# Patient Record
Sex: FEMALE | Race: WHITE | NOT HISPANIC OR LATINO | Employment: FULL TIME | URBAN - METROPOLITAN AREA
[De-identification: names, ages, dates, MRNs, and addresses within clinical notes are randomized per-mention and may not be internally consistent; named-entity substitution may affect disease eponyms.]

---

## 2022-10-05 ENCOUNTER — LAB REQUISITION (OUTPATIENT)
Dept: LAB | Facility: CLINIC | Age: 22
End: 2022-10-05

## 2022-10-05 ENCOUNTER — LAB REQUISITION (OUTPATIENT)
Dept: LAB | Facility: CLINIC | Age: 22
End: 2022-10-05
Payer: COMMERCIAL

## 2022-10-05 DIAGNOSIS — O26.851 SPOTTING COMPLICATING PREGNANCY, FIRST TRIMESTER: ICD-10-CM

## 2022-10-05 DIAGNOSIS — Z32.01 ENCOUNTER FOR PREGNANCY TEST, RESULT POSITIVE: ICD-10-CM

## 2022-10-05 PROCEDURE — 86850 RBC ANTIBODY SCREEN: CPT | Mod: ORL | Performed by: STUDENT IN AN ORGANIZED HEALTH CARE EDUCATION/TRAINING PROGRAM

## 2022-10-05 PROCEDURE — 86803 HEPATITIS C AB TEST: CPT | Mod: ORL | Performed by: STUDENT IN AN ORGANIZED HEALTH CARE EDUCATION/TRAINING PROGRAM

## 2022-10-05 PROCEDURE — 87340 HEPATITIS B SURFACE AG IA: CPT | Mod: ORL | Performed by: STUDENT IN AN ORGANIZED HEALTH CARE EDUCATION/TRAINING PROGRAM

## 2022-10-05 PROCEDURE — 87389 HIV-1 AG W/HIV-1&-2 AB AG IA: CPT | Mod: ORL | Performed by: STUDENT IN AN ORGANIZED HEALTH CARE EDUCATION/TRAINING PROGRAM

## 2022-10-05 PROCEDURE — 86762 RUBELLA ANTIBODY: CPT | Mod: ORL | Performed by: STUDENT IN AN ORGANIZED HEALTH CARE EDUCATION/TRAINING PROGRAM

## 2022-10-05 PROCEDURE — 86901 BLOOD TYPING SEROLOGIC RH(D): CPT | Mod: ORL | Performed by: STUDENT IN AN ORGANIZED HEALTH CARE EDUCATION/TRAINING PROGRAM

## 2022-10-05 PROCEDURE — 86780 TREPONEMA PALLIDUM: CPT | Mod: ORL | Performed by: STUDENT IN AN ORGANIZED HEALTH CARE EDUCATION/TRAINING PROGRAM

## 2022-10-06 LAB
ABO/RH(D): NORMAL
ANTIBODY SCREEN: NEGATIVE
ERYTHROCYTE [DISTWIDTH] IN BLOOD BY AUTOMATED COUNT: NORMAL %
HCT VFR BLD AUTO: NORMAL %
HGB BLD-MCNC: NORMAL G/DL
MCH RBC QN AUTO: NORMAL PG
MCHC RBC AUTO-ENTMCNC: NORMAL G/DL
MCV RBC AUTO: NORMAL FL
PLATELET # BLD AUTO: NORMAL 10*3/UL
RBC # BLD AUTO: NORMAL 10*6/UL
SPECIMEN EXPIRATION DATE: NORMAL
WBC # BLD AUTO: NORMAL 10*3/UL

## 2022-10-07 LAB
HBV SURFACE AG SERPL QL IA: NONREACTIVE
HCV AB SERPL QL IA: NONREACTIVE
HIV 1+2 AB+HIV1 P24 AG SERPL QL IA: NONREACTIVE
RUBV IGG SERPL QL IA: 6.23 INDEX
RUBV IGG SERPL QL IA: POSITIVE
T PALLIDUM AB SER QL: NONREACTIVE

## 2022-10-27 ENCOUNTER — LAB REQUISITION (OUTPATIENT)
Dept: LAB | Facility: CLINIC | Age: 22
End: 2022-10-27
Payer: COMMERCIAL

## 2022-10-27 DIAGNOSIS — Z12.4 ENCOUNTER FOR SCREENING FOR MALIGNANT NEOPLASM OF CERVIX: ICD-10-CM

## 2022-10-27 DIAGNOSIS — Z86.59 PERSONAL HISTORY OF OTHER MENTAL AND BEHAVIORAL DISORDERS: ICD-10-CM

## 2022-10-27 PROCEDURE — 87591 N.GONORRHOEAE DNA AMP PROB: CPT | Mod: ORL | Performed by: STUDENT IN AN ORGANIZED HEALTH CARE EDUCATION/TRAINING PROGRAM

## 2022-10-27 PROCEDURE — 87086 URINE CULTURE/COLONY COUNT: CPT | Mod: ORL | Performed by: STUDENT IN AN ORGANIZED HEALTH CARE EDUCATION/TRAINING PROGRAM

## 2022-10-27 PROCEDURE — G0145 SCR C/V CYTO,THINLAYER,RESCR: HCPCS | Mod: ORL | Performed by: STUDENT IN AN ORGANIZED HEALTH CARE EDUCATION/TRAINING PROGRAM

## 2022-10-27 PROCEDURE — 87491 CHLMYD TRACH DNA AMP PROBE: CPT | Mod: ORL | Performed by: STUDENT IN AN ORGANIZED HEALTH CARE EDUCATION/TRAINING PROGRAM

## 2022-10-28 LAB
C TRACH DNA SPEC QL PROBE+SIG AMP: NEGATIVE
N GONORRHOEA DNA SPEC QL NAA+PROBE: NEGATIVE

## 2022-10-30 LAB — BACTERIA UR CULT: ABNORMAL

## 2022-10-31 LAB
BKR LAB AP GYN ADEQUACY: NORMAL
BKR LAB AP GYN INTERPRETATION: NORMAL
BKR LAB AP HPV REFLEX: NO
BKR LAB AP LMP: NORMAL
BKR LAB AP PREVIOUS ABNORMAL: NORMAL
PATH REPORT.COMMENTS IMP SPEC: NORMAL
PATH REPORT.COMMENTS IMP SPEC: NORMAL
PATH REPORT.RELEVANT HX SPEC: NORMAL

## 2023-03-16 ENCOUNTER — LAB REQUISITION (OUTPATIENT)
Dept: LAB | Facility: CLINIC | Age: 23
End: 2023-03-16
Payer: COMMERCIAL

## 2023-03-16 DIAGNOSIS — Z3A.28 28 WEEKS GESTATION OF PREGNANCY: ICD-10-CM

## 2023-03-16 PROCEDURE — 86780 TREPONEMA PALLIDUM: CPT | Mod: ORL | Performed by: STUDENT IN AN ORGANIZED HEALTH CARE EDUCATION/TRAINING PROGRAM

## 2023-03-17 LAB — T PALLIDUM AB SER QL: NONREACTIVE

## 2023-05-16 ENCOUNTER — LAB REQUISITION (OUTPATIENT)
Dept: LAB | Facility: CLINIC | Age: 23
End: 2023-05-16
Payer: COMMERCIAL

## 2023-05-16 DIAGNOSIS — Z3A.37 37 WEEKS GESTATION OF PREGNANCY: ICD-10-CM

## 2023-05-16 PROCEDURE — 87653 STREP B DNA AMP PROBE: CPT | Mod: ORL | Performed by: STUDENT IN AN ORGANIZED HEALTH CARE EDUCATION/TRAINING PROGRAM

## 2023-05-17 LAB — GP B STREP DNA SPEC QL NAA+PROBE: NEGATIVE

## 2024-11-17 ENCOUNTER — HOSPITAL ENCOUNTER (EMERGENCY)
Facility: CLINIC | Age: 24
Discharge: HOME | End: 2024-11-17
Payer: COMMERCIAL

## 2024-11-17 VITALS
SYSTOLIC BLOOD PRESSURE: 113 MMHG | TEMPERATURE: 97.9 F | HEART RATE: 75 BPM | DIASTOLIC BLOOD PRESSURE: 62 MMHG | BODY MASS INDEX: 21.7 KG/M2 | HEIGHT: 71 IN | RESPIRATION RATE: 17 BRPM | WEIGHT: 155 LBS | OXYGEN SATURATION: 100 %

## 2024-11-17 DIAGNOSIS — K03.81 NONTRAUMATIC BROKEN OR CRACKED TOOTH: ICD-10-CM

## 2024-11-17 DIAGNOSIS — K13.79 INFECTION OF BUCCAL SPACE: ICD-10-CM

## 2024-11-17 PROCEDURE — 99283 EMERGENCY DEPT VISIT LOW MDM: CPT

## 2024-11-17 PROCEDURE — 250N000013 HC RX MED GY IP 250 OP 250 PS 637

## 2024-11-17 RX ORDER — PENICILLIN V POTASSIUM 250 MG/1
250 TABLET, FILM COATED ORAL ONCE
Status: DISCONTINUED | OUTPATIENT
Start: 2024-11-17 | End: 2024-11-17

## 2024-11-17 RX ORDER — PENICILLIN V POTASSIUM 250 MG/1
500 TABLET, FILM COATED ORAL ONCE
Status: COMPLETED | OUTPATIENT
Start: 2024-11-17 | End: 2024-11-17

## 2024-11-17 RX ORDER — PENICILLIN V POTASSIUM 500 MG/1
500 TABLET, FILM COATED ORAL 4 TIMES DAILY
Qty: 40 TABLET | Refills: 0 | Status: SHIPPED | OUTPATIENT
Start: 2024-11-17 | End: 2024-11-27

## 2024-11-17 RX ADMIN — PENICILLIN V POTASSIUM 500 MG: 250 TABLET, FILM COATED ORAL at 13:03

## 2024-11-17 ASSESSMENT — COLUMBIA-SUICIDE SEVERITY RATING SCALE - C-SSRS
2. HAVE YOU ACTUALLY HAD ANY THOUGHTS OF KILLING YOURSELF IN THE PAST MONTH?: NO
6. HAVE YOU EVER DONE ANYTHING, STARTED TO DO ANYTHING, OR PREPARED TO DO ANYTHING TO END YOUR LIFE?: NO
1. IN THE PAST MONTH, HAVE YOU WISHED YOU WERE DEAD OR WISHED YOU COULD GO TO SLEEP AND NOT WAKE UP?: NO

## 2024-11-17 ASSESSMENT — ACTIVITIES OF DAILY LIVING (ADL)
ADLS_ACUITY_SCORE: 0
ADLS_ACUITY_SCORE: 0

## 2024-11-17 NOTE — DISCHARGE INSTRUCTIONS
Based on my physical exam and your symptoms, you have an oral infection which is very likely the cause of your symptoms.  For this, I will be sending you home with an antibiotic. Please continue to take tylenol, ibuprofen and ice the area for pain and swelling. Below is a list of easily accessible dental clinic that could discuss further management with you. Please return to the ED if you experience fever, chills, inability to open the mouth, difficulty swallowing.    [facility]   [facility]  [address], [facility] [[email or web address]s  [phone] Accepts [place]    [facility]  [address] , [facility] [[email or web address]s  [phone] No Fee Hours and services vary each month - call them before going.  Sometimes only offer extractions.   [facility]  [address] ,[facility]  [phone] Sliding fee: ER visit - $50 up front  regular - $35 up front Call or arrive at 7:45 AM on Mondays, Tues, or Thursdays to sign up for same-day appointments for dentalpain / emergencies.        [facility] Sliding  fee  Call for details Walk-ins and same-day appointments  Walk-in's accepted 8-11AM and 1-4PM  Monday-Friday   [address]     [phone]          [facility] ([facility] dental Sliding fee If no insurance, call first.    [address], [place]     [phone]          [facility]  [address], [place]  [phone] Sliding fee Call 8:00 AM Mon-Thurs for next-day  appointments (for emergencies/pain only) Help with [place] paperwork is available.        [facility]  [address], [facility] [[email or web address]s  [phone] Call for fees Only for adult dental emergencies.  Costs about 30% less than private practice clinics  To sign up for the regular dental clinic, call [phone].        [facility] ([facility])  [address]  [phone] Sliding fee scale For people without dentalinsurance only.   Cleaning, xray, exams, fillings, sealants  only - no extractions or rootcanals, etc.  No walk-ins.        [facility] / [facility]  [address] [place] [address]  [phone] No Fee No walk-ins, not accepting people with insurance. Extractions for uninsured people only. Call Friday 2PM to get on next week's list        [facility]   [address]  [phone] Sliding fee  $40 up front No walk-ins. Call at 8AM Mon-Fri forsame- day visits.  Can schedule Saturday emergency visits by calling Friday morning.   [facility]  [address]  [phone] Sliding fee  Call for details No walk-ins.  For emergency appointments:  Call on Thursday 3PM (for Friday appt) OR Call on Friday 3PM (for Monday appt)        [facility]  [address] [facility]  [phone] Sliding fee -   Call for details Call on Tuesdays at 3PM to get anurgent Weds. appointment.  Call anytime during business hours to schedule other appointments.             OTHER RESOURCES       [facility],   [address]  [address] [place]  [address]  [phone]    [facility]  [address] [place] [address]  [phone]      Referral Service, 3-800-DENTIST        Open 9a to 9p 7 days a week            Open 7 days a week 7a to 5p.                [employer], 1-499.650.7868 For people who are elderly,disabled, or medically compromised and have no other way to pay for dental care. Call to get an application. If approved, services are provided through volunteer dentists.

## 2024-11-17 NOTE — ED TRIAGE NOTES
Patient arrives with dental pain starting around 10/31/2024. Patient states that it is a known abscess. Is not taking anything for the abscess. Increased swelling to left side of face per patient. Needs to get tooth removed but is dealing with finances.      Triage Assessment (Adult)       Row Name 11/17/24 1151          Triage Assessment    Airway WDL WDL        Respiratory WDL    Respiratory WDL WDL        Skin Circulation/Temperature WDL    Skin Circulation/Temperature WDL WDL        Cardiac WDL    Cardiac WDL WDL        Peripheral/Neurovascular WDL    Peripheral Neurovascular WDL WDL        Cognitive/Neuro/Behavioral WDL    Cognitive/Neuro/Behavioral WDL WDL

## 2024-11-17 NOTE — ED PROVIDER NOTES
Emergency Department Encounter   NAME: Vikki Jimenez  AGE: 24 year old female   YOB: 2000 ;   MRN: [id number] ;    ED PROVIDER: Renetta Cueto PA-C    PCP: No primary care provider on file.    Evaluation Date & Time:   11/17/2024 11:45 AM    CHIEF COMPLAINT:  Dental Pain      FINAL IMPRESSION:    ICD-10-CM    1. Infection of buccal space  K13.79       2. Nontraumatic broken or cracked tooth  K03.81             IMPRESSION AND PLAN   MDM: Vikki Jimenez is a 24 year old female with a pertinent history of adjustment disorder, oppositional defiant disorder, ADHD who presents to the ED by walk-in for evaluation of dental pain for the last 6 weeks or so.  Patient reports he does complain of worsening pain and swelling around Halloween.  Unfortunately, she has been unable to follow-up with her dentist due to financial restraint.  She reports she had a dental infection on the right lower jaw several months ago for which she needed antibiotics and had the tooth removed.  She states the symptoms are very similar and she is concerned for dental infection.    Vitals stable. Mild facial swelling of the L lower jaw. Tenderness to palpation along the buccal mucosa where the swelling is. Nontraumatic broken tooth at #20. No obvious gingival swelling or drainage surrounding the tooth. Differentials include oral infection likely as a result of the broken tooth. No obvious fluctuant mass to suggest abscess. No trimus, dysphagia or raised tongue to suggest hayde's angina. Patient afebrile and normotensive, certainly no concern for sepsis or bacteremia. Remainder of oral exam unremarkable.      Based on physical exam and description of symptoms, I believe patient has a buccal infection likely originating from the broken tooth. I will provide first dose of antibiotic here in the ED and send the remainder to the patient's pharmacy. I encouraged patient to continue NSAIDs and icing the area for pain and swelling. I  also provided her with a listen of easily accessible and cheaper dental clinic and recommend she follow-up as soon as possible for further evaluation and management of the infection. Patient is agreeable to this plan and feels comfortable with discharge at this time.     Medical Decision Making  Obtained supplemental history:Supplemental history obtained?: No  Reviewed external records: External records reviewed?: Documented in chart  Care impacted by chronic illness:Documented in Chart  Care significantly affected by social determinants of health:N/A  Did you consider but not order tests?: Work up considered but not performed and documented in chart, if applicable  Did you interpret images independently?: Independent interpretation of ECG and images noted in documentation, when applicable.  Consultation discussion with other provider:Did you involve another provider (consultant, , pharmacy, etc.)?: No  Discharge. I prescribed additional prescription strength medication(s) as charted. See documentation for any additional details.    Dental Pain:MDM for Pain Management: No opiates were prescribed as recommended by ACEP to reduce patient harm related to the opioid crisis.       Chart Review: N/A      ED COURSE:  11:55 AM I met and introduced myself to the patient. I gathered initial history and performed my physical exam. We discussed plan for initial workup.   12:10 PM I rechecked the patient and discussed results, discharge, follow up, and reasons to return to the ED.           MEDICATIONS GIVEN IN THE EMERGENCY DEPARTMENT:  Medications   penicillin V (VEETID) tablet 250 mg (has no administration in time range)         NEW PRESCRIPTIONS STARTED AT TODAY'S ED VISIT:  New Prescriptions    PENICILLIN V (VEETID) 500 MG TABLET    Take 1 tablet (500 mg) by mouth 4 times daily for 10 days.         BRIEF HPI   Patient information was obtained from: patient   Use of Intrepreter: N/A     Vikki R Pearlmei is a 24 year old  "female with no pertinent history who presents to the ED by walk for evaluation of dental pain.    Patient presents with left, lower dental pain that began swelling and irritation around Halloween. She visited the dentist regarding symptoms but notes that the expense and timeframe did not work with her, therefore, she presents to the ED to deal with a possible infection. The area of swelling and pain has a tooth that broke over time due to a crack that became weak, but denies any trauma. Patient had similar symptoms on her right side in which the tooth was infected and later had to get her tooth pulled. She has been managing symptoms with ice, warm compresses, Tylenol but denies any improvement in pain. Denies any dysphagia.       REVIEW OF SYSTEMS:  Pertinent positive and negative symptoms per HPI.       MEDICAL HISTORY     No past medical history on file.    No past surgical history on file.    No family history on file.      PHYSICAL EXAM     First Vitals:  Patient Vitals for the past 24 hrs:   BP Temp Temp src Pulse Resp SpO2 Height Weight   11/17/24 1148 132/63 97.9  F (36.6  C) Oral 89 17 100 % 1.803 m (5' 11\") 70.3 kg (155 lb)       PHYSICAL EXAM:  Physical Exam  Vitals and nursing note reviewed.   Constitutional:       General: She is not in acute distress.     Appearance: Normal appearance. She is normal weight. She is not ill-appearing or toxic-appearing.   HENT:      Head: Normocephalic and atraumatic.      Jaw: Tenderness and swelling present. No trismus or pain on movement.      Salivary Glands: Right salivary gland is not diffusely enlarged or tender. Left salivary gland is not diffusely enlarged or tender.      Comments: Swelling noted along the L lower jaw with tenderness to palpation.      Mouth/Throat:      Lips: Pink. No lesions.      Mouth: Mucous membranes are moist.      Dentition: Abnormal dentition. Dental tenderness and gingival swelling present. No dental abscesses.      Tongue: No lesions. " Tongue does not deviate from midline.      Palate: No mass and lesions.      Pharynx: Oropharynx is clear. Uvula midline. No pharyngeal swelling, oropharyngeal exudate or posterior oropharyngeal erythema.      Tonsils: No tonsillar exudate or tonsillar abscesses.      Comments: Broken tooth #20.  Musculoskeletal:      Cervical back: No rigidity or crepitus. No pain with movement.   Lymphadenopathy:      Cervical: No cervical adenopathy.   Neurological:      Mental Status: She is alert.          RESULTS     LAB:  All pertinent labs reviewed and interpreted  Labs Ordered and Resulted from Time of ED Arrival to Time of ED Departure - No data to display      RADIOLOGY:  No orders to display         Nati LORENZANA, am serving as a scribe to document services personally performed by Renetta Cueto PA-C, based on my observation and the provider's statements to me. Renetta LORENZANA PA-C attest that Nati Funes is acting in a scribe capacity, has observed my performance of the services and has documented them in accordance with my direction.       Renetta Cueto PA-C  Emergency Medicine   [facility] EMERGENCY ROOM       Renetta Cueto PA-C  11/17/24 8395